# Patient Record
Sex: FEMALE | Race: WHITE | URBAN - METROPOLITAN AREA
[De-identification: names, ages, dates, MRNs, and addresses within clinical notes are randomized per-mention and may not be internally consistent; named-entity substitution may affect disease eponyms.]

---

## 2017-02-10 ENCOUNTER — HISTORICAL (OUTPATIENT)
Dept: RADIOLOGY | Facility: HOSPITAL | Age: 77
End: 2017-02-10

## 2018-04-02 ENCOUNTER — HISTORICAL (OUTPATIENT)
Dept: RADIOLOGY | Facility: HOSPITAL | Age: 78
End: 2018-04-02

## 2018-08-08 ENCOUNTER — HISTORICAL (OUTPATIENT)
Dept: RADIOLOGY | Facility: HOSPITAL | Age: 78
End: 2018-08-08

## 2021-03-03 ENCOUNTER — HISTORICAL (OUTPATIENT)
Dept: LAB | Facility: HOSPITAL | Age: 81
End: 2021-03-03

## 2021-05-12 ENCOUNTER — HISTORICAL (OUTPATIENT)
Dept: RADIOLOGY | Facility: HOSPITAL | Age: 81
End: 2021-05-12

## 2021-06-24 ENCOUNTER — HOSPITAL ENCOUNTER (OUTPATIENT)
Dept: MEDSURG UNIT | Facility: HOSPITAL | Age: 81
End: 2021-06-25
Attending: INTERNAL MEDICINE | Admitting: INTERNAL MEDICINE

## 2021-06-25 LAB
ABS NEUT (OLG): 3.22
ALBUMIN SERPL-MCNC: 3.1 GM/DL (ref 3.4–4.8)
ALBUMIN/GLOB SERPL: 1 RATIO (ref 1.1–2)
ALP SERPL-CCNC: 65 UNIT/L (ref 40–150)
ALT SERPL-CCNC: 11 UNIT/L (ref 0–55)
AST SERPL-CCNC: 13 UNIT/L (ref 5–34)
BASOPHILS # BLD AUTO: 0.01 X10(3)/MCL
BASOPHILS NFR BLD AUTO: 0.2 %
BILIRUB SERPL-MCNC: 0.3 MG/DL
BILIRUBIN DIRECT+TOT PNL SERPL-MCNC: 0.1 MG/DL
BILIRUBIN DIRECT+TOT PNL SERPL-MCNC: 0.2 MG/DL (ref 0–0.5)
BUN SERPL-MCNC: 23 MG/DL (ref 9.8–20.1)
CALCIUM SERPL-MCNC: 8.9 MG/DL (ref 8.4–10.2)
CHLORIDE SERPL-SCNC: 106 MMOL/L (ref 98–107)
CO2 SERPL-SCNC: 29 MEQ/L (ref 23–31)
CREAT SERPL-MCNC: 0.69 MG/DL (ref 0.55–1.02)
EOSINOPHIL # BLD AUTO: 0.08 X10(3)/MCL
EOSINOPHIL NFR BLD AUTO: 1.4 %
ERYTHROCYTE [DISTWIDTH] IN BLOOD BY AUTOMATED COUNT: 16 %
GLOBULIN SER-MCNC: 3.1 GM/DL (ref 2.4–3.5)
GLUCOSE SERPL-MCNC: 86 MG/DL (ref 82–115)
HCT VFR BLD AUTO: 29.3 % (ref 34–46)
HGB BLD-MCNC: 8.7 GM/DL (ref 11.3–15.4)
IMM GRANULOCYTES # BLD AUTO: 0.02 10*3/UL (ref 0–0.1)
IMM GRANULOCYTES NFR BLD AUTO: 0.4 % (ref 0–1)
LYMPHOCYTES # BLD AUTO: 1.5 X10(3)/MCL
LYMPHOCYTES NFR BLD AUTO: 26.9 %
MAGNESIUM SERPL-MCNC: 2.1 MG/DL (ref 1.6–2.6)
MCH RBC QN AUTO: 28.7 PG (ref 27–33)
MCHC RBC AUTO-ENTMCNC: 29.7 GM/DL (ref 32–35)
MCV RBC AUTO: 96.7 FL (ref 81–97)
MONOCYTES # BLD AUTO: 0.74 X10(3)/MCL
MONOCYTES NFR BLD AUTO: 13.3 %
NEUTROPHILS # BLD AUTO: 3.22 X10(3)/MCL
NEUTROPHILS NFR BLD AUTO: 57.8 %
PLATELET # BLD AUTO: 174 X10(3)/MCL (ref 140–450)
PMV BLD AUTO: 10 FL
POTASSIUM SERPL-SCNC: 4.3 MMOL/L (ref 3.5–5.1)
PROT SERPL-MCNC: 6.2 GM/DL (ref 5.8–7.6)
RBC # BLD AUTO: 3.03 X10(6)/MCL (ref 3.9–5)
SODIUM SERPL-SCNC: 142 MMOL/L (ref 136–145)
WBC # SPEC AUTO: 5.57 X10(3)/MCL (ref 3.4–9.2)

## 2021-07-19 ENCOUNTER — HISTORICAL (OUTPATIENT)
Dept: RADIOLOGY | Facility: HOSPITAL | Age: 81
End: 2021-07-19

## 2021-07-25 ENCOUNTER — HISTORICAL (OUTPATIENT)
Dept: LAB | Facility: HOSPITAL | Age: 81
End: 2021-07-25

## 2021-07-25 LAB
ABS NEUT (OLG): 2.1 X10(3)/MCL (ref 1.5–6.9)
ALBUMIN SERPL-MCNC: 3.5 GM/DL (ref 3.4–4.8)
ALBUMIN/GLOB SERPL: 1.1 RATIO (ref 1.1–2)
ALP SERPL-CCNC: 90 UNIT/L (ref 40–150)
ALT SERPL-CCNC: 15 UNIT/L (ref 0–55)
APPEARANCE, UA: ABNORMAL
AST SERPL-CCNC: 17 UNIT/L (ref 5–34)
BACTERIA SPEC CULT: ABNORMAL /HPF
BILIRUB SERPL-MCNC: 0.3 MG/DL
BILIRUB UR QL STRIP: NEGATIVE
BILIRUBIN DIRECT+TOT PNL SERPL-MCNC: 0.1 MG/DL (ref 0–0.8)
BILIRUBIN DIRECT+TOT PNL SERPL-MCNC: 0.2 MG/DL (ref 0–0.5)
BUN SERPL-MCNC: 20 MG/DL (ref 9.8–20.1)
CALCIUM SERPL-MCNC: 10 MG/DL (ref 8.4–10.2)
CHLORIDE SERPL-SCNC: 101 MMOL/L (ref 98–107)
CO2 SERPL-SCNC: 33 MMOL/L (ref 23–31)
COLOR UR: YELLOW
CREAT SERPL-MCNC: 0.71 MG/DL (ref 0.55–1.02)
ERYTHROCYTE [DISTWIDTH] IN BLOOD BY AUTOMATED COUNT: 14.6 % (ref 11.5–17)
GLOBULIN SER-MCNC: 3.2 GM/DL (ref 2.4–3.5)
GLUCOSE (UA): NEGATIVE MG/DL
GLUCOSE SERPL-MCNC: 88 MG/DL (ref 82–115)
HCT VFR BLD AUTO: 34.4 % (ref 36–48)
HGB BLD-MCNC: 11.2 GM/DL (ref 12–16)
HGB UR QL STRIP: ABNORMAL
KETONES UR QL STRIP: NEGATIVE MG/DL
LEUKOCYTE ESTERASE UR QL STRIP: ABNORMAL
MCH RBC QN AUTO: 30 PG (ref 27–34)
MCHC RBC AUTO-ENTMCNC: 33 GM/DL (ref 31–36)
MCV RBC AUTO: 93 FL (ref 80–99)
NITRITE UR QL STRIP: NEGATIVE
PH UR STRIP: 6 [PH] (ref 4.6–8)
PLATELET # BLD AUTO: 142 X10(3)/MCL (ref 140–400)
PMV BLD AUTO: 11.6 FL (ref 6.8–10)
POTASSIUM SERPL-SCNC: 3.8 MMOL/L (ref 3.5–5.1)
PROT SERPL-MCNC: 6.7 GM/DL (ref 5.8–7.6)
PROT UR QL STRIP: >=300 MG/DL
RBC # BLD AUTO: 3.7 X10(6)/MCL (ref 4.2–5.4)
RBC #/AREA URNS HPF: ABNORMAL /HPF
SODIUM SERPL-SCNC: 140 MMOL/L (ref 136–145)
SP GR UR STRIP: >=1.03 (ref 1–1.03)
SQUAMOUS EPITHELIAL, UA: ABNORMAL /LPF
UROBILINOGEN UR STRIP-ACNC: 0.2 EU/DL
WBC # SPEC AUTO: 4 X10(3)/MCL (ref 4.5–11.5)
WBC #/AREA URNS HPF: ABNORMAL /HPF

## 2022-04-10 ENCOUNTER — HISTORICAL (OUTPATIENT)
Dept: ADMINISTRATIVE | Facility: HOSPITAL | Age: 82
End: 2022-04-10

## 2022-04-26 VITALS
BODY MASS INDEX: 31.39 KG/M2 | WEIGHT: 200 LBS | DIASTOLIC BLOOD PRESSURE: 103 MMHG | SYSTOLIC BLOOD PRESSURE: 180 MMHG | HEIGHT: 67 IN

## 2022-05-03 NOTE — HISTORICAL [facility]
This is a historical note converted from [facility]. Formatting and pictures may have been removed.  Please reference [facility] for original formatting and attached multimedia. Admit and Discharge Dates  Admit Date: 06/24/2021  Discharge Date: 06/25/2021  Physicians  Attending Physician - Sam GOLD, Donte SHIRLEY  Admitting Physician - Sam GOLD, Donte SHIRLEY  Primary Care Physician - Aren GOLD, [place] Castro  Discharge Diagnosis  1.?Head injury with loss of consciousness?S06.9X4A  2.?Dementia?F03.90  3.?Weakness?R53.1  4.?Anemia?D64.9  Surgical Procedures  No procedures recorded for this visit.  Immunizations  No immunizations recorded for this visit.  Admission Information  81yo female presents to the ED today s/p a fall at home. Apparently she got up in the middle of the night and walked outside.? She walked about 1/4 mile before falling hitting her head.? We are unsure of LOC.? She has a large contusion to her right orbit.? The family does state that she does fall form time to time.? Thus far all of her imaging has been negative and her blood work is negative.? No N/V/D/C.? NO fever/chills.? No chest pain/SOB.? She also gets frequent UTIs but her urine is clear today.? She will be admitted for over night observation and monitoring. [1]  Hospital Course  Pt admitted and monitored overnight. She remained stable and at her baseline. She was discharged home in stable condition with .  Time Spent on discharge  34 minutes  Objective  Physical Exam  GEN: WNWD, comfortable appearing  HEENT: NCAT, MMM, EOMI, neck supple, no thyromegaly, anicteric sclerae, conjunctivae pink  CV: RRR, no MRG  CHEST: CTAB, no WRR  ABD: BS+, soft, NTND  EXTR: MAEW, +edema  SKIN: warm, dry, no lesions  NEURO: CN II-XII intact grossly, no focal deficits  PSYCH: calm, cooperative, pleasant  Patient Discharge Condition  stable, at baseline  Discharge Disposition  home with    Discharge Medication Reconciliation  Continue  conjugated estrogens topical  (Premarin Vaginal)?0.625 mg, TOP, Once a day (at bedtime)  cranberry (AZO Cranberry Gummies oral tablet, chewable)?2 tab(s), Oral, Daily  docusate (docusate sodium 50 mg oral capsule)?1 tab(s), Oral, At Bedtime  ferrous fumarate (ferrous fumarate 324 mg oral tablet)?324 mg, Oral, Daily  folic acid (folic acid 1 mg oral tablet)?1 mg, Oral, Daily  mirtazapine (mirtazapine 15 mg oral tablet)?15 mg, Oral, qPM  nitrofurantoin (nitrofurantoin macrocrystals 50 mg oral capsule)?50 mg, Oral, M,W,F  Discontinue  QUEtiapine (QUEtiapine 50 mg oral tablet)?50 mg, Oral, Daily  acetaminophen (Tylenol Extra Strength 500 mg oral tablet)?1,000 mg, Oral, At Bedtime, PRN as needed for fever  Education and Orders Provided  Fall Prevention and Home Safety, Easy-to-Read (Custom)  Dementia, Easy-to-Read  Discharge - 06/25/21 16:41:00 CDT, Home?  Discharge Activity - Activity as Tolerated?  Discharge Diet - Regular?  Follow up  [place] Younger, on 07/29/2021     [1]?Admission H & P; Sam GOLD, Donte SHIRLEY 06/24/2021 14:25 CDT

## 2022-05-03 NOTE — HISTORICAL [facility]
This is a historical note converted from [place]. Formatting and pictures may have been removed.  Please reference [place] for original formatting and attached multimedia. Chief Complaint  weakness s/p a fall  History of Present Illness  79yo female presents to the ED today s/p a fall at home. Apparently she got up in the middle of the night and walked outside.? She walked about 1/4 mile before falling hitting her head.? We are unsure of LOC.? She has a large contusion to her right orbit.? The family does state that she does fall form time to time.? Thus far all of her imaging has been negative and her blood work is negative.? No N/V/D/C.? NO fever/chills.? No chest pain/SOB.? She also gets frequent UTIs but her urine is clear today.? She will be admitted for over night observation and monitoring.  Review of Systems  ?  ?????Constitutional: ?No fever, No chills, No sweats,?+ weakness, No fatigue. ?  ?????Eye: ?No double vision, No dry eyes, No photophobia. ?  ?????Ear/Nose/Mouth/Throat: ?No ear pain, No nasal congestion, No sore throat. ?  ?????Respiratory: ?No shortness of breath, No cough, No sputum production, No hemoptysis, No wheezing, No pleuritic pain. ?  ?????Cardiovascular: ?No chest pain, No palpitations,?+ peripheral edema, No syncope. ?  ?????Gastrointestinal: ?No nausea, No vomiting, No diarrhea, No constipation, No heartburn, No abdominal pain. ?  ?????Genitourinary: ?No dysuria, No hematuria, No change in urine stream. ?  ?????Hematology/Lymphatics: ?No anemia, No bruising tendency, No bruise, No hemorrhage, No petechiae. ?  ?????Endocrine: ?No excessive thirst, No polyuria, No cold intolerance. ?  ?????Immunologic: ?No recurrent fevers, No recurrent infections. ?  ?????Musculoskeletal: ?Joint pain, No back pain, No muscle pain, No decreased range of motion. ?  ?????Integumentary: ?No rash, No pruritus, No petechiae,?+ skin lesion. ?  ?????Neurologic: ?Alert and oriented X4, No abnormal balance, No  confusion, No tingling. ?  ?????Psychiatric: ?No anxiety, No depression. ?  Physical Exam  Vitals & Measurements  T:?36.3? ?C (Oral)? HR:?78(Peripheral)? RR:?16? BP:?147/74? SpO2:?100%? WT:?67.2?kg? BMI:?22.71?  General: ?Alert and oriented, No acute distress. ?  ??????? ? Appearance: Well nourished. ?  ??????? ? Behavior: Appropriate. ?  ??????? ? Skin: Normal for ethnicity. ?  ?????Eye: ?Pupils are equal, round and reactive to light, Extraocular movements are intact. ?  ?????HENT: ?Normocephalic, Normal hearing, Oral mucosa is moist, No pharyngeal erythema. ?  ?????Neck: ?Supple, Non-tender, No carotid bruit, No jugular venous distention, No lymphadenopathy, No thyromegaly. ?  ?????Respiratory: ?Lungs are clear to auscultation, Breath sounds are equal, No chest wall tenderness. ?  ?????Cardiovascular: ?Normal rate, Regular rhythm, No murmur, No gallop, Good pulses equal in all extremities, Normal peripheral perfusion,?+ edema. ?  ?????Gastrointestinal: ?Soft, Non-tender, Non-distended, Normal bowel sounds, No organomegaly. ?  ?????Genitourinary: ?No costovertebral angle tenderness, No urethral discharge. ?  ?????Lymphatics: ?No lymphadenopathy neck, axilla, groin. ?  ?????Musculoskeletal: ?Normal range of motion, Normal strength, No tenderness, No swelling, Normal gait. ?  ?????Integumentary: ?Warm, Dry, Pink, Intact, No rash. ?abrasion and swelling to right orbit  ?????Neurologic: ?Alert, Oriented, Normal sensory, Normal motor function, No focal deficits, Cranial Nerves II-XII are grossly intact. ?  ?????Psychiatric: ?Cooperative, Appropriate mood & affect, Normal judgment. ?  Assessment/Plan  1.?Dementia?F03.90  2.?Head injury with loss of consciousness?S06.9X4A  3.?Weakness?R53.1  4.?Anemia?D64.9  Orders:  Magnesium Level, AM Next collect, 06/25/21 5:00:00 CDT, Blood, Stop date 06/25/21 5:00:00 CDT, Lab Collect, 06/25/21 5:00:00 CDT  Admit to observation on 6/24/21 at 12:59  review home meds  neuro checks  DVT  prophylaxis  PT consult  telemetry  possible d/c tomorrow if stable   Problem List/Past Medical History  Ongoing  Dementia  Head injury with loss of consciousness  Obesity  Recurrent UTI - urinary tract infection  TIA (transient ischemic attack)  Historical  No qualifying data  Procedure/Surgical History  Gallbladder (2001)  Cholecystectomy  Hysterectomy  Tonsillectomy   Medications  Inpatient  acetaminophen, 650 mg= 2 tab(s), Oral, q6hr, PRN  labetalol, 20 mg= 4 mL, IV Push, q2hr, PRN  Lovenox, 40 mg= 0.4 mL, Subcutaneous, Daily  Protonix, 40 mg= 1 EA, IV Slow, Daily  Sodium Chloride 0.9% intravenous solution 1,000 mL, 1000 mL, IV  Zofran, 4 mg= 2 mL, IV Push, q4hr, PRN  Home  AZO Cranberry Gummies oral tablet, chewable, 2 tab(s), Oral, Daily  docusate sodium 50 mg oral capsule, 1 tab(s), Oral, At Bedtime  ferrous fumarate 324 mg oral tablet, 324 mg= 1 tab(s), Oral, Daily  folic acid 1 mg oral tablet, 1 mg= 1 tab(s), Oral, Daily,? ?Not taking  mirtazapine 15 mg oral tablet, 15 mg= 1 tab(s), Oral, qPM  nitrofurantoin macrocrystals 50 mg oral capsule, 50 mg= 1 cap(s), Oral, M,W,F  Premarin Vaginal, 0.625 mg, TOP, Once a day (at bedtime)  QUEtiapine 50 mg oral tablet, 50 mg= 1 tab(s), Oral, Daily,? ?Not taking  Tylenol Extra Strength 500 mg oral tablet, 1000 mg= 2 tab(s), Oral, At Bedtime, PRN  Allergies  No Known Medication Allergies  Social History  Abuse/Neglect  No, 06/24/2021  No, 05/02/2021  No, 10/28/2020  Alcohol  Past, 06/24/2021  Tobacco  Former smoker, quit more than 30 days ago, N/A, 06/24/2021  Former smoker, quit more than 30 days ago, N/A, 05/02/2021  Former smoker, quit more than 30 days ago, No, 10/28/2020  Former smoker Use:. Stopped age 57 Years., 08/20/2018  Family History  Coronary artery disease: Mother and Father.  Hypertension.: Mother and Father.  Parkinson disease: Sister.  Valvular heart disease: Mother.  Immunizations  Vaccine Date Status   COVID-19 MRNA, LNP-S, PF- Pfizer 02/23/2021 Given    COVID-19 MRNA, LNP-S, PF- Pfizer 02/02/2021 Given   Lab Results  Test Name Test Result Date/Time Comments   Sodium Lvl 141 mmol/L 06/24/2021 09:57 CDT    Potassium Lvl 4.1 mmol/L 06/24/2021 09:57 CDT    Chloride 105 mmol/L 06/24/2021 09:57 CDT    CO2 30 mEq/L 06/24/2021 09:57 CDT    Calcium Lvl 9.3 mg/dL 06/24/2021 09:57 CDT    Magnesium Lvl 2.20 mg/dL 06/24/2021 09:57 CDT    Glucose Lvl 86 mg/dL 06/24/2021 09:57 CDT    BUN 26.0 mg/dL (High) 06/24/2021 09:57 CDT    Creatinine 0.83 mg/dL 06/24/2021 09:57 CDT    AST 14 unit/L 06/24/2021 09:57 CDT    ALT 13 unit/L 06/24/2021 09:57 CDT    Albumin Lvl 3.6 gm/dL 06/24/2021 09:57 CDT    Total CK 49 U/L 06/24/2021 09:57 CDT    CK MB 0.7 ng/mL 06/24/2021 09:57 CDT ? ? ? ? ? Population ? ? ? ? ? ? ? ? ? ? ? ?n ? ? ? ? ? ? ? 99th Percentile (ng/mL)  ?  Heparinized Plasma:  Female ? ? ? ? ? ? ? ? ? ? ? ? ? ? 225 ? ? ? ? ? ? ? ? ? ? ? ? ? ? ? ? ? ?3.4  Male ? ? ? ? ? ? ? ? ? ? ? ? ? ? ? ? ?224 ? ? ? ? ? ? ? ? ? ? ? ? ? ? ? ? ? ?5.1  TOTAL ? ? ? ? ? ? ? ? ? ? ? ? ? ? ? 449 ? ? ? ? ? ? ? ? ? ? ? ? ? ? ? ? ? ?4.7  ?  EDTA Plasma:  Female ? ? ? ? ? ? ? ? ? ? ? ? ? ?225 ? ? ? ? ? ? ? ? ? ? ? ? ? ? ? ? ? ?3.7  Male ? ? ? ? ? ? ? ? ? ? ? ? ? ? ? ? 224 ? ? ? ? ? ? ? ? ? ? ? ? ? ? ? ? ? ?6.6  TOTAL ? ? ? ? ? ? ? ? ? ? ? ? ? ? ?449 ? ? ? ? ? ? ? ? ? ? ? ? ? ? ? ? ? ? ?5.0  ?  Serum:  Female ? ? ? ? ? ? ? ? ? ? ? ? ? 225 ? ? ? ? ? ? ? ? ? ? ? ? ? ? ? ? ? ?3.1  Male ? ? ? ? ? ? ? ? ? ? ? ? ? ? ? ?224 ? ? ? ? ? ? ? ? ? ? ? ? ? ? ? ? ? ?5.2  TOTAL ? ? ? ? ? ? ? ? ? ? ? ? ? ? 449 ? ? ? ? ? ? ? ? ? ? ? ? ? ? ? ? ? ?4.0   Troponin-I <0.010 ng/mL 06/24/2021 09:57 CDT 0.5 ng/mL is the accepted discriminant level for myocardial injury rather than a statistical normal limit for the general population.   TSH 2.3901 uIU/mL 06/24/2021 09:57 CDT    WBC 5.43 x10(3)/mcL 06/24/2021 09:57 CDT    RBC 3.19 x10(6)/mcL (Low) 06/24/2021 09:57 CDT    Hgb 9.4 gm/dL (Low) 06/24/2021 09:57 CDT    Hct 30.7 % (Low)  06/24/2021 09:57 CDT    Platelet 197 x10(3)/mcL 06/24/2021 09:57 CDT    UA Appear CLEAR 06/24/2021 12:05 CDT    UA Color YELLOW 06/24/2021 12:05 CDT    UA Spec Grav 1.020 06/24/2021 12:05 CDT    UA Bili Negative UA 06/24/2021 12:05 CDT    UA pH 7.0 06/24/2021 12:05 CDT    UA Urobilinogen 0.2 06/24/2021 12:05 CDT    UA Blood 1+ 06/24/2021 12:05 CDT    UA Glucose Negative UA 06/24/2021 12:05 CDT    UA Ketones Negative UA 06/24/2021 12:05 CDT    UA Protein 1+ 06/24/2021 12:05 CDT    UA Nitrite Negative UA 06/24/2021 12:05 CDT    UA Leuk Est Negative UA 06/24/2021 12:05 CDT    UA Bacteria None Seen 06/24/2021 12:05 CDT    COVID-19 Rapid NEGATIVE 06/24/2021 12:10 CDT ID NOW is an automated ?assay that utilizes ?isothermal nucleic acid amplification technology intended for the qualitative detection SARS-Co-@ viral nucleic acids.